# Patient Record
Sex: FEMALE | Race: WHITE | NOT HISPANIC OR LATINO | Employment: FULL TIME | URBAN - NONMETROPOLITAN AREA
[De-identification: names, ages, dates, MRNs, and addresses within clinical notes are randomized per-mention and may not be internally consistent; named-entity substitution may affect disease eponyms.]

---

## 2018-12-14 ENCOUNTER — HISTORICAL (OUTPATIENT)
Dept: ADMINISTRATIVE | Facility: HOSPITAL | Age: 38
End: 2018-12-14

## 2019-06-17 PROBLEM — F90.0 ATTENTION DEFICIT HYPERACTIVITY DISORDER (ADHD), PREDOMINANTLY INATTENTIVE TYPE: Status: ACTIVE | Noted: 2018-10-18

## 2019-10-16 PROBLEM — F90.0 ATTENTION DEFICIT HYPERACTIVITY DISORDER (ADHD), PREDOMINANTLY INATTENTIVE TYPE: Chronic | Status: ACTIVE | Noted: 2018-10-18

## 2022-06-27 ENCOUNTER — HISTORICAL (OUTPATIENT)
Dept: ADMINISTRATIVE | Facility: HOSPITAL | Age: 42
End: 2022-06-27

## 2023-03-15 ENCOUNTER — HOSPITAL ENCOUNTER (OUTPATIENT)
Dept: RADIOLOGY | Facility: HOSPITAL | Age: 43
Discharge: HOME | End: 2023-03-15
Attending: NURSE PRACTITIONER
Payer: MEDICAID

## 2023-03-15 DIAGNOSIS — K82.9 DISEASE OF GALLBLADDER: ICD-10-CM

## 2023-03-15 PROCEDURE — 78226 HEPATOBILIARY SYSTEM IMAGING: CPT | Mod: TC

## 2023-03-27 LAB — CRC RECOMMENDATION EXT: NORMAL

## 2023-06-26 ENCOUNTER — CLINICAL SUPPORT (OUTPATIENT)
Dept: RESPIRATORY THERAPY | Facility: HOSPITAL | Age: 43
End: 2023-06-26
Attending: NURSE PRACTITIONER
Payer: MEDICAID

## 2023-06-26 ENCOUNTER — HOSPITAL ENCOUNTER (OUTPATIENT)
Dept: RADIOLOGY | Facility: HOSPITAL | Age: 43
Discharge: HOME | End: 2023-06-26
Attending: NURSE PRACTITIONER
Payer: MEDICAID

## 2023-06-26 VITALS — WEIGHT: 242 LBS | BODY MASS INDEX: 44.53 KG/M2 | HEIGHT: 62 IN

## 2023-06-26 DIAGNOSIS — F90.9 ATTENTION DEFICIT HYPERACTIVITY DISORDER: ICD-10-CM

## 2023-06-26 DIAGNOSIS — F90.9 ATTENTION DEFICIT HYPERACTIVITY DISORDER: Primary | ICD-10-CM

## 2023-06-26 DIAGNOSIS — Z12.31 BREAST CANCER SCREENING BY MAMMOGRAM: ICD-10-CM

## 2023-06-26 PROCEDURE — 93005 ELECTROCARDIOGRAM TRACING: CPT

## 2023-06-26 PROCEDURE — 77067 SCR MAMMO BI INCL CAD: CPT | Mod: TC

## 2023-06-26 PROCEDURE — 93010 ELECTROCARDIOGRAM REPORT: CPT | Mod: ,,, | Performed by: INTERNAL MEDICINE

## 2023-06-26 PROCEDURE — 93010 EKG 12-LEAD: ICD-10-PCS | Mod: ,,, | Performed by: INTERNAL MEDICINE

## 2023-07-12 ENCOUNTER — HOSPITAL ENCOUNTER (OUTPATIENT)
Dept: RADIOLOGY | Facility: HOSPITAL | Age: 43
Discharge: HOME | End: 2023-07-12
Attending: NURSE PRACTITIONER
Payer: MEDICAID

## 2023-07-12 DIAGNOSIS — R92.8 ABNORMAL MAMMOGRAM: ICD-10-CM

## 2023-07-12 PROCEDURE — 76641 ULTRASOUND BREAST COMPLETE: CPT | Mod: TC,50

## 2023-12-18 ENCOUNTER — HOSPITAL ENCOUNTER (OUTPATIENT)
Dept: RADIOLOGY | Facility: HOSPITAL | Age: 43
Discharge: HOME | End: 2023-12-18
Attending: NURSE PRACTITIONER
Payer: MEDICAID

## 2023-12-18 DIAGNOSIS — M25.512 LEFT SHOULDER PAIN: ICD-10-CM

## 2023-12-18 PROCEDURE — 73030 X-RAY EXAM OF SHOULDER: CPT | Mod: TC,LT

## 2024-06-25 LAB
CHOLEST SERPL-MSCNC: 213 MG/DL (ref 0–200)
HBA1C MFR BLD: 5.9 % (ref 4–6)
HDLC SERPL-MCNC: 66 MG/DL (ref 35–70)
LDL/HDL RATIO: 1.9
NONHDLC SERPL-MCNC: 124 MG/DL
TRIGL SERPL-MCNC: 134 MG/DL (ref 40–160)

## 2024-07-03 ENCOUNTER — HOSPITAL ENCOUNTER (OUTPATIENT)
Dept: RADIOLOGY | Facility: HOSPITAL | Age: 44
Discharge: HOME | End: 2024-07-03
Attending: NURSE PRACTITIONER
Payer: MEDICAID

## 2024-07-03 DIAGNOSIS — Z12.31 SCREENING MAMMOGRAM FOR HIGH-RISK PATIENT: ICD-10-CM

## 2024-07-03 PROCEDURE — 77067 SCR MAMMO BI INCL CAD: CPT | Mod: TC

## 2024-07-23 ENCOUNTER — OFFICE VISIT (OUTPATIENT)
Dept: FAMILY MEDICINE | Facility: CLINIC | Age: 44
End: 2024-07-23
Payer: MEDICAID

## 2024-07-23 VITALS
TEMPERATURE: 98 F | HEART RATE: 76 BPM | DIASTOLIC BLOOD PRESSURE: 82 MMHG | OXYGEN SATURATION: 96 % | SYSTOLIC BLOOD PRESSURE: 120 MMHG | BODY MASS INDEX: 46.6 KG/M2 | HEIGHT: 63 IN | WEIGHT: 263 LBS

## 2024-07-23 DIAGNOSIS — Z00.00 ENCOUNTER FOR MEDICAL EXAMINATION TO ESTABLISH CARE: Primary | ICD-10-CM

## 2024-07-23 DIAGNOSIS — F90.0 ATTENTION DEFICIT HYPERACTIVITY DISORDER (ADHD), PREDOMINANTLY INATTENTIVE TYPE: Chronic | ICD-10-CM

## 2024-07-23 DIAGNOSIS — K57.90 DIVERTICULOSIS: ICD-10-CM

## 2024-07-23 DIAGNOSIS — R73.03 PREDIABETES: ICD-10-CM

## 2024-07-23 PROCEDURE — 3008F BODY MASS INDEX DOCD: CPT | Mod: CPTII,,, | Performed by: NURSE PRACTITIONER

## 2024-07-23 PROCEDURE — 1160F RVW MEDS BY RX/DR IN RCRD: CPT | Mod: CPTII,,, | Performed by: NURSE PRACTITIONER

## 2024-07-23 PROCEDURE — 3079F DIAST BP 80-89 MM HG: CPT | Mod: CPTII,,, | Performed by: NURSE PRACTITIONER

## 2024-07-23 PROCEDURE — 99203 OFFICE O/P NEW LOW 30 MIN: CPT | Mod: ,,, | Performed by: NURSE PRACTITIONER

## 2024-07-23 PROCEDURE — 1159F MED LIST DOCD IN RCRD: CPT | Mod: CPTII,,, | Performed by: NURSE PRACTITIONER

## 2024-07-23 PROCEDURE — 3074F SYST BP LT 130 MM HG: CPT | Mod: CPTII,,, | Performed by: NURSE PRACTITIONER

## 2024-07-23 RX ORDER — CITALOPRAM 10 MG/1
TABLET ORAL
COMMUNITY

## 2024-07-23 RX ORDER — ALBUTEROL SULFATE 90 UG/1
AEROSOL, METERED RESPIRATORY (INHALATION)
COMMUNITY
Start: 2024-02-12

## 2024-07-23 RX ORDER — TRIAMCINOLONE ACETONIDE 1 MG/G
CREAM TOPICAL
COMMUNITY
Start: 2024-07-17 | End: 2024-07-23

## 2024-07-23 RX ORDER — ATOMOXETINE 40 MG/1
40 CAPSULE ORAL DAILY
Qty: 30 CAPSULE | Refills: 0 | Status: SHIPPED | OUTPATIENT
Start: 2024-07-23 | End: 2024-08-22

## 2024-07-23 RX ORDER — LISDEXAMFETAMINE DIMESYLATE 50 MG/1
50 CAPSULE ORAL EVERY MORNING
COMMUNITY

## 2024-07-23 NOTE — PROGRESS NOTES
"Patient ID: [id number]     Chief Complaint: Establish Care      HPI:     Alice Hoffman is a 44 y.o. 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  She really likes the hours.  She has been dx w/prediabetes.  Tried several medications, most had se or didn't work.  Had repeat labs one month ago, uncertain of results but A1C had increased a little.   Colonoscopy last year for rectal bleeding, found diverticulosis. She's been able to figure out since then that taken everyday, both Celexa and Vyvanse cause her to have rectal bleeding even if taken alone. She's been off Celexa for months, denies any anxiety/depression significant enough to start another medication.    Past Medical History:  has no past medical history on file.    Social History:  reports that she has never smoked. She has never used smokeless tobacco.    Current Outpatient Medications   Medication Instructions    albuterol (PROVENTIL/VENTOLIN HFA) 90 mcg/actuation inhaler inhale 2 PUFFS EVERY 4 TO 6 HOURS AS NEEDED -- SHAKE WELL BEFORE USE    atomoxetine (STRATTERA) 40 mg, Oral, Daily    citalopram (CELEXA) 10 MG tablet TAKE 1 TABLET DAILY FOR DEPRESSION Oral for 30 Days    VYVANSE 50 mg, Oral, Every morning       Patient is allergic to penicillins.     Patient Care Team:  Kathie Lora, SIMINP-C as PCP - General (Family Medicine)  Javy Lora OD as Consulting Physician (Optometry)  Sicard, Mark A., MD (General Surgery)     Subjective     Review of Systems    See HPI     Objective     Visit Vitals  /82 (BP Location: Right arm, Patient Position: Sitting, BP Method: Medium (Manual))   Pulse 76   Temp 97.9 °F (36.6 °C) (Temporal)   Ht 5' 2.6" (1.59 m)   Wt 119.3 kg (263 lb)   SpO2 96%   BMI 47.19 kg/m²       Physical Exam  Vitals reviewed.   Constitutional:       General: She is not in acute distress.     Appearance: Normal appearance. She is obese.   HENT:      Head: " Normocephalic.      Nose: Nose normal.      Mouth/Throat:      Mouth: Mucous membranes are moist.      Pharynx: Oropharynx is clear.   Cardiovascular:      Rate and Rhythm: Normal rate and regular rhythm.      Heart sounds: Normal heart sounds.   Pulmonary:      Effort: Pulmonary effort is normal.      Breath sounds: Normal breath sounds.   Musculoskeletal:         General: Normal range of motion.      Cervical back: Normal range of motion and neck supple.   Skin:     General: Skin is warm and dry.   Neurological:      Mental Status: She is alert and oriented to person, place, and time. Mental status is at baseline.   Psychiatric:         Mood and Affect: Mood normal.         Behavior: Behavior normal.         Assessment & Plan:     1. Encounter for medical examination to establish care    2. Attention deficit hyperactivity disorder (ADHD), predominantly inattentive type  Overview:  Adderall caused stomach issues  On Vyvanse 50 mg. (Causes rectal bleeding when taken daily)    Assessment & Plan:  Stop Vyvanse.  Start Strattera 40 mg daily. RTC 1 month    Orders:  -     atomoxetine (STRATTERA) 40 MG capsule; Take 1 capsule (40 mg total) by mouth once daily.  Dispense: 30 capsule; Refill: 0    3. Prediabetes  Assessment & Plan:  Get most recent labs for review      4. Diverticulosis  Assessment & Plan:  Get colonoscopy report           Follow up for 1), 1 mo f/u ha, review labs. In addition to their next scheduled appointment, the patient has also been instructed to follow up on as needed basis.     Future Appointments   Date Time Provider Department Center   8/29/2024  7:30 AM Julisa Harrington, SAR[facility] I spent a total of 38 minutes on the day of the visit.  This includes face to face time and non-face to face time preparing to see the patient (eg, review of tests), obtaining and/or reviewing separately obtained history, documenting clinical information in the electronic or other health record,  independently interpreting results and communicating results to the patient/family/caregiver, or care coordinator.

## 2024-07-24 ENCOUNTER — PATIENT OUTREACH (OUTPATIENT)
Facility: CLINIC | Age: 44
End: 2024-07-24
Payer: MEDICAID

## 2024-09-04 ENCOUNTER — OFFICE VISIT (OUTPATIENT)
Dept: FAMILY MEDICINE | Facility: CLINIC | Age: 44
End: 2024-09-04
Payer: MEDICAID

## 2024-09-04 ENCOUNTER — PATIENT OUTREACH (OUTPATIENT)
Facility: CLINIC | Age: 44
End: 2024-09-04
Payer: MEDICAID

## 2024-09-04 VITALS
HEART RATE: 86 BPM | SYSTOLIC BLOOD PRESSURE: 118 MMHG | OXYGEN SATURATION: 99 % | DIASTOLIC BLOOD PRESSURE: 72 MMHG | BODY MASS INDEX: 46.78 KG/M2 | HEIGHT: 63 IN | WEIGHT: 264 LBS | TEMPERATURE: 98 F

## 2024-09-04 DIAGNOSIS — E66.01 CLASS 3 SEVERE OBESITY WITH BODY MASS INDEX (BMI) OF 45.0 TO 49.9 IN ADULT, UNSPECIFIED OBESITY TYPE, UNSPECIFIED WHETHER SERIOUS COMORBIDITY PRESENT: ICD-10-CM

## 2024-09-04 DIAGNOSIS — R12 HEARTBURN: ICD-10-CM

## 2024-09-04 DIAGNOSIS — R73.03 PREDIABETES: Primary | ICD-10-CM

## 2024-09-04 PROCEDURE — 3008F BODY MASS INDEX DOCD: CPT | Mod: CPTII,,, | Performed by: NURSE PRACTITIONER

## 2024-09-04 PROCEDURE — 1160F RVW MEDS BY RX/DR IN RCRD: CPT | Mod: CPTII,,, | Performed by: NURSE PRACTITIONER

## 2024-09-04 PROCEDURE — 3074F SYST BP LT 130 MM HG: CPT | Mod: CPTII,,, | Performed by: NURSE PRACTITIONER

## 2024-09-04 PROCEDURE — 99214 OFFICE O/P EST MOD 30 MIN: CPT | Mod: ,,, | Performed by: NURSE PRACTITIONER

## 2024-09-04 PROCEDURE — 1159F MED LIST DOCD IN RCRD: CPT | Mod: CPTII,,, | Performed by: NURSE PRACTITIONER

## 2024-09-04 PROCEDURE — 3044F HG A1C LEVEL LT 7.0%: CPT | Mod: CPTII,,, | Performed by: NURSE PRACTITIONER

## 2024-09-04 PROCEDURE — 3078F DIAST BP <80 MM HG: CPT | Mod: CPTII,,, | Performed by: NURSE PRACTITIONER

## 2024-09-04 RX ORDER — SEMAGLUTIDE 1 MG/.5ML
1 INJECTION, SOLUTION SUBCUTANEOUS
Status: CANCELLED | OUTPATIENT
Start: 2024-10-16

## 2024-09-04 RX ORDER — SEMAGLUTIDE 0.5 MG/.5ML
0.5 INJECTION, SOLUTION SUBCUTANEOUS
Qty: 2 ML | Refills: 0 | Status: SHIPPED | OUTPATIENT
Start: 2024-09-25 | End: 2024-09-04 | Stop reason: SDUPTHER

## 2024-09-04 RX ORDER — SEMAGLUTIDE 0.25 MG/.5ML
0.25 INJECTION, SOLUTION SUBCUTANEOUS
Qty: 2 ML | Refills: 0 | Status: SHIPPED | OUTPATIENT
Start: 2024-09-04 | End: 2024-10-02

## 2024-09-04 RX ORDER — SEMAGLUTIDE 0.25 MG/.5ML
0.25 INJECTION, SOLUTION SUBCUTANEOUS
Qty: 2 ML | Refills: 0 | Status: SHIPPED | OUTPATIENT
Start: 2024-09-04 | End: 2024-09-04 | Stop reason: SDUPTHER

## 2024-09-04 RX ORDER — SEMAGLUTIDE 0.5 MG/.5ML
0.5 INJECTION, SOLUTION SUBCUTANEOUS
Qty: 2 ML | Refills: 0 | Status: SHIPPED | OUTPATIENT
Start: 2024-09-25 | End: 2024-10-23

## 2024-09-04 NOTE — PROGRESS NOTES
Health Maintenance Topic(s) Outreach Outcomes & Actions Taken:    Lab(s) - Outreach Outcomes & Actions Taken  : Hyperlinked labs from [facility]

## 2024-09-04 NOTE — PROGRESS NOTES
"Patient ID: [id number]     Chief Complaint: Results      HPI:     Alice Hoffman is a 44 y.o. female in the office for Results      Has samples of rybelsis for several months last year, does was supposed to be increased but never received.  Caused her to feel dizzy, took it at night.     Past Medical History:  has no past medical history on file.    Social History:  reports that she has never smoked. She has never used smokeless tobacco.    Current Outpatient Medications   Medication Instructions    albuterol (PROVENTIL/VENTOLIN HFA) 90 mcg/actuation inhaler inhale 2 PUFFS EVERY 4 TO 6 HOURS AS NEEDED -- SHAKE WELL BEFORE USE       Patient is allergic to penicillins.     Patient Care Team:  Julisa Harrington FNP-C as PCP - General (Family Medicine)  Javy Lora OD as Consulting Physician (Optometry)  Sicard, Mark A., MD (General Surgery)     Subjective     Review of Systems    See HPI     Objective     Visit Vitals  /72 (BP Location: Right arm)   Pulse 86   Temp 98.1 °F (36.7 °C) (Temporal)   Ht 5' 2.6" (1.59 m)   Wt 119.7 kg (264 lb)   SpO2 99%   BMI 47.37 kg/m²       Physical Exam    Assessment & Plan:     1. Prediabetes    2. Heartburn         No follow-ups on file. In addition to their next scheduled appointment, the patient has also been instructed to follow up on as needed basis.     No future appointments.       "

## 2024-09-26 DIAGNOSIS — E66.01 CLASS 3 SEVERE OBESITY WITH BODY MASS INDEX (BMI) OF 45.0 TO 49.9 IN ADULT, UNSPECIFIED OBESITY TYPE, UNSPECIFIED WHETHER SERIOUS COMORBIDITY PRESENT: ICD-10-CM

## 2024-09-26 RX ORDER — SEMAGLUTIDE 0.25 MG/.5ML
0.25 INJECTION, SOLUTION SUBCUTANEOUS
Qty: 2 ML | Refills: 0 | OUTPATIENT
Start: 2024-09-26 | End: 2024-10-24

## 2024-09-26 RX ORDER — SEMAGLUTIDE 0.5 MG/.5ML
0.5 INJECTION, SOLUTION SUBCUTANEOUS
Qty: 2 ML | Refills: 0 | OUTPATIENT
Start: 2024-09-26 | End: 2024-10-24

## 2024-10-23 DIAGNOSIS — E66.813 CLASS 3 SEVERE OBESITY WITH BODY MASS INDEX (BMI) OF 45.0 TO 49.9 IN ADULT, UNSPECIFIED OBESITY TYPE, UNSPECIFIED WHETHER SERIOUS COMORBIDITY PRESENT: ICD-10-CM

## 2024-10-23 DIAGNOSIS — E66.01 CLASS 3 SEVERE OBESITY WITH BODY MASS INDEX (BMI) OF 45.0 TO 49.9 IN ADULT, UNSPECIFIED OBESITY TYPE, UNSPECIFIED WHETHER SERIOUS COMORBIDITY PRESENT: ICD-10-CM

## 2024-10-23 RX ORDER — SEMAGLUTIDE 0.5 MG/.5ML
0.5 INJECTION, SOLUTION SUBCUTANEOUS
Qty: 2 ML | Refills: 0 | Status: CANCELLED | OUTPATIENT
Start: 2024-10-23 | End: 2024-11-20

## 2024-10-24 RX ORDER — SEMAGLUTIDE 1 MG/.5ML
1 INJECTION, SOLUTION SUBCUTANEOUS
Qty: 1 ML | Refills: 5 | Status: SHIPPED | OUTPATIENT
Start: 2024-10-24

## 2024-10-25 ENCOUNTER — PATIENT MESSAGE (OUTPATIENT)
Dept: FAMILY MEDICINE | Facility: CLINIC | Age: 44
End: 2024-10-25
Payer: MEDICAID

## 2024-10-25 ENCOUNTER — TELEPHONE (OUTPATIENT)
Dept: FAMILY MEDICINE | Facility: CLINIC | Age: 44
End: 2024-10-25
Payer: MEDICAID

## 2024-10-25 DIAGNOSIS — E66.813 CLASS 3 SEVERE OBESITY WITH BODY MASS INDEX (BMI) OF 45.0 TO 49.9 IN ADULT, UNSPECIFIED OBESITY TYPE, UNSPECIFIED WHETHER SERIOUS COMORBIDITY PRESENT: ICD-10-CM

## 2024-10-25 DIAGNOSIS — E66.01 CLASS 3 SEVERE OBESITY WITH BODY MASS INDEX (BMI) OF 45.0 TO 49.9 IN ADULT, UNSPECIFIED OBESITY TYPE, UNSPECIFIED WHETHER SERIOUS COMORBIDITY PRESENT: ICD-10-CM

## 2024-10-25 RX ORDER — SEMAGLUTIDE 0.5 MG/.5ML
0.5 INJECTION, SOLUTION SUBCUTANEOUS
Qty: 2 ML | Refills: 0 | Status: SHIPPED | OUTPATIENT
Start: 2024-10-25 | End: 2024-11-22

## 2024-11-11 ENCOUNTER — TELEPHONE (OUTPATIENT)
Dept: FAMILY MEDICINE | Facility: CLINIC | Age: 44
End: 2024-11-11

## 2024-11-11 ENCOUNTER — OFFICE VISIT (OUTPATIENT)
Dept: FAMILY MEDICINE | Facility: CLINIC | Age: 44
End: 2024-11-11
Payer: MEDICAID

## 2024-11-11 VITALS
HEART RATE: 81 BPM | HEIGHT: 63 IN | OXYGEN SATURATION: 97 % | BODY MASS INDEX: 46.95 KG/M2 | SYSTOLIC BLOOD PRESSURE: 124 MMHG | WEIGHT: 265 LBS | TEMPERATURE: 97 F | DIASTOLIC BLOOD PRESSURE: 88 MMHG

## 2024-11-11 DIAGNOSIS — E11.9 TYPE 2 DIABETES MELLITUS WITHOUT COMPLICATION, WITHOUT LONG-TERM CURRENT USE OF INSULIN: Primary | ICD-10-CM

## 2024-11-11 PROCEDURE — 3074F SYST BP LT 130 MM HG: CPT | Mod: CPTII,,, | Performed by: NURSE PRACTITIONER

## 2024-11-11 PROCEDURE — 99214 OFFICE O/P EST MOD 30 MIN: CPT | Mod: ,,, | Performed by: NURSE PRACTITIONER

## 2024-11-11 PROCEDURE — 1160F RVW MEDS BY RX/DR IN RCRD: CPT | Mod: CPTII,,, | Performed by: NURSE PRACTITIONER

## 2024-11-11 PROCEDURE — 1159F MED LIST DOCD IN RCRD: CPT | Mod: CPTII,,, | Performed by: NURSE PRACTITIONER

## 2024-11-11 PROCEDURE — 3008F BODY MASS INDEX DOCD: CPT | Mod: CPTII,,, | Performed by: NURSE PRACTITIONER

## 2024-11-11 PROCEDURE — 3079F DIAST BP 80-89 MM HG: CPT | Mod: CPTII,,, | Performed by: NURSE PRACTITIONER

## 2024-11-11 PROCEDURE — 3044F HG A1C LEVEL LT 7.0%: CPT | Mod: CPTII,,, | Performed by: NURSE PRACTITIONER

## 2024-11-11 RX ORDER — SEMAGLUTIDE 0.68 MG/ML
0.5 INJECTION, SOLUTION SUBCUTANEOUS
Qty: 3 ML | Refills: 2 | Status: SHIPPED | OUTPATIENT
Start: 2024-11-11 | End: 2025-02-09

## 2024-11-11 NOTE — PROGRESS NOTES
"Patient ID: [id number]     Chief Complaint: Obesity (2 month follow up )      HPI:     Alice Hoffman is a 44 y.o. female in the office for Obesity (2 month follow up )  Was doing well with weight loss while taking semaglutide.  Has been off for about 1 month, some weight re-gained.  Has been checking blood sugar with a family members glucometer and fasting sugars over 130.  She has a history of prediabetes, suspect A1c has increased.    Past Medical History:  has no past medical history on file.    Social History:  reports that she has never smoked. She has never used smokeless tobacco.    Current Outpatient Medications   Medication Instructions    albuterol (PROVENTIL/VENTOLIN HFA) 90 mcg/actuation inhaler inhale 2 PUFFS EVERY 4 TO 6 HOURS AS NEEDED -- SHAKE WELL BEFORE USE    OZEMPIC 0.5 mg, Subcutaneous, Every 7 days       Patient is allergic to penicillins.     Patient Care Team:  Julisa Harrington FNP-C as PCP - General (Family Medicine)  Javy Lora OD as Consulting Physician (Optometry)  Sicard, Mark A., MD (General Surgery)     Subjective     Review of Systems    See HPI     Objective     Visit Vitals  /88 (BP Location: Left arm, Patient Position: Sitting)   Pulse 81   Temp 97.3 °F (36.3 °C) (Temporal)   Ht 5' 2.6" (1.59 m)   Wt 120.2 kg (265 lb)   SpO2 97%   BMI 47.55 kg/m²       Physical Exam  Vitals reviewed.   Constitutional:       General: She is not in acute distress.     Appearance: She is obese.   Cardiovascular:      Rate and Rhythm: Normal rate and regular rhythm.      Heart sounds: Normal heart sounds.   Pulmonary:      Effort: Pulmonary effort is normal.      Breath sounds: Normal breath sounds.   Skin:     General: Skin is warm and dry.   Neurological:      Mental Status: She is alert and oriented to person, place, and time.   Psychiatric:         Mood and Affect: Mood normal.         Assessment & Plan:     1. Type 2 diabetes mellitus without complication, without long-term " current use of insulin  Assessment & Plan:  Encouraged diet and exercise  Start semaglutide 0.25 mg and increase to 0.5 mg after 1 month.    RTC 3 months     Orders:  -     semaglutide (OZEMPIC) 0.25 mg or 0.5 mg (2 mg/3 mL) pen injector; Inject 0.5 mg into the skin every 7 days.  Dispense: 3 mL; Refill: 2  -     CBC Auto Differential; Future; Expected date: 02/11/2025  -     Comprehensive Metabolic Panel; Future; Expected date: 02/11/2025  -     Lipid Panel; Future; Expected date: 02/11/2025  -     TSH; Future; Expected date: 02/11/2025  -     Hemoglobin A1C; Future; Expected date: 02/11/2025  -     Iron and TIBC; Future; Expected date: 02/11/2025  -     Ferritin; Future; Expected date: 02/11/2025       Follow up in about 3 months (around 2/11/2025) for Obesity, fasting labs prior. In addition to their next scheduled appointment, the patient has also been instructed to follow up on as needed basis.     Future Appointments   Date Time Provider Department Center   2/10/2025  9:40 AM LAB, [facility] LABORATORY D[facility]   2/17/2025 11:30 AM Julisa Harrington, FNP-C [facility]

## 2024-11-14 PROBLEM — E11.9 TYPE 2 DIABETES MELLITUS WITHOUT COMPLICATION, WITHOUT LONG-TERM CURRENT USE OF INSULIN: Status: ACTIVE | Noted: 2024-11-14

## 2024-11-14 NOTE — ASSESSMENT & PLAN NOTE
Encouraged diet and exercise  Start semaglutide 0.25 mg and increase to 0.5 mg after 1 month.    RTC 3 months

## 2025-01-06 ENCOUNTER — PATIENT MESSAGE (OUTPATIENT)
Dept: FAMILY MEDICINE | Facility: CLINIC | Age: 45
End: 2025-01-06
Payer: MEDICAID

## 2025-01-06 DIAGNOSIS — E11.9 TYPE 2 DIABETES MELLITUS WITHOUT COMPLICATION, WITHOUT LONG-TERM CURRENT USE OF INSULIN: ICD-10-CM

## 2025-01-06 RX ORDER — SEMAGLUTIDE 0.68 MG/ML
0.5 INJECTION, SOLUTION SUBCUTANEOUS
Qty: 3 ML | Refills: 2 | Status: SHIPPED | OUTPATIENT
Start: 2025-01-06 | End: 2025-04-06

## 2025-02-03 ENCOUNTER — OFFICE VISIT (OUTPATIENT)
Dept: FAMILY MEDICINE | Facility: CLINIC | Age: 45
End: 2025-02-03
Payer: MEDICAID

## 2025-02-03 VITALS
HEART RATE: 78 BPM | HEIGHT: 63 IN | WEIGHT: 256.19 LBS | DIASTOLIC BLOOD PRESSURE: 84 MMHG | OXYGEN SATURATION: 99 % | BODY MASS INDEX: 45.39 KG/M2 | SYSTOLIC BLOOD PRESSURE: 128 MMHG | TEMPERATURE: 97 F

## 2025-02-03 DIAGNOSIS — M54.50 ACUTE BILATERAL LOW BACK PAIN, UNSPECIFIED WHETHER SCIATICA PRESENT: Primary | ICD-10-CM

## 2025-02-03 PROCEDURE — 3074F SYST BP LT 130 MM HG: CPT | Mod: CPTII,,, | Performed by: NURSE PRACTITIONER

## 2025-02-03 PROCEDURE — 3079F DIAST BP 80-89 MM HG: CPT | Mod: CPTII,,, | Performed by: NURSE PRACTITIONER

## 2025-02-03 PROCEDURE — 3008F BODY MASS INDEX DOCD: CPT | Mod: CPTII,,, | Performed by: NURSE PRACTITIONER

## 2025-02-03 PROCEDURE — 1159F MED LIST DOCD IN RCRD: CPT | Mod: CPTII,,, | Performed by: NURSE PRACTITIONER

## 2025-02-03 PROCEDURE — 99214 OFFICE O/P EST MOD 30 MIN: CPT | Mod: ,,, | Performed by: NURSE PRACTITIONER

## 2025-02-03 RX ORDER — PREDNISONE 20 MG/1
20 TABLET ORAL 2 TIMES DAILY
Qty: 14 TABLET | Refills: 0 | Status: SHIPPED | OUTPATIENT
Start: 2025-02-03 | End: 2025-02-10

## 2025-02-03 RX ORDER — TIZANIDINE 4 MG/1
4 TABLET ORAL EVERY 8 HOURS PRN
Qty: 20 TABLET | Refills: 0 | Status: SHIPPED | OUTPATIENT
Start: 2025-02-03

## 2025-02-03 NOTE — PROGRESS NOTES
"Patient ID: Alice Hoffman  : 1980     Chief Complaint: Back Pain    Allergies: Patient is allergic to penicillins.     History of Present Illness:  The patient is a 44 y.o. White female who presents to clinic for evaluation and management with a chief complaint of Back Pain   HPI Pt states that she was throwing trash into a dumpster and felt something pop in her back on Friday.  She has used NSIADS over the weekend without improvement.  She saw chiropractor today which helped initially, the pain cam back.  No radiation of the pain down her leg, but it is starting to moved into her left buttock a little she reports.    Social History:  reports that she has never smoked. She has never used smokeless tobacco.    Past Medical History:  has no past medical history on file.    Care Team: Patient Care Team:  Julisa Harrington FNP-C as PCP - General (Family Medicine)  Javy Lora OD as Consulting Physician (Optometry)  Sicard, Mark A., MD (General Surgery)     Current Medications:  Current Outpatient Medications   Medication Instructions    albuterol (PROVENTIL/VENTOLIN HFA) 90 mcg/actuation inhaler inhale 2 PUFFS EVERY 4 TO 6 HOURS AS NEEDED -- SHAKE WELL BEFORE USE    OZEMPIC 0.5 mg, Subcutaneous, Every 7 days    predniSONE (DELTASONE) 20 mg, Oral, 2 times daily    tiZANidine (ZANAFLEX) 4 mg, Oral, Every 8 hours PRN       Review of Systems   Twelve point review of system conducted, negative except as stated in the history of present illness.  See HPI for details.      Visit Vitals  /84 (BP Location: Right arm, Patient Position: Sitting)   Pulse 78   Temp 97.3 °F (36.3 °C) (Oral)   Ht 5' 2.6" (1.59 m)   Wt 116.2 kg (256 lb 3.2 oz)   SpO2 99%   BMI 45.97 kg/m²       Physical Exam  Constitutional:       Appearance: Normal appearance.   HENT:      Head: Normocephalic and atraumatic.      Nose: Nose normal.      Mouth/Throat:      Mouth: Mucous membranes are moist.      Pharynx: Oropharynx is " clear.   Eyes:      Conjunctiva/sclera: Conjunctivae normal.   Cardiovascular:      Rate and Rhythm: Normal rate and regular rhythm.   Pulmonary:      Effort: Pulmonary effort is normal.      Breath sounds: Normal breath sounds.   Abdominal:      General: Bowel sounds are normal.      Palpations: Abdomen is soft.   Musculoskeletal:      Cervical back: Normal range of motion and neck supple.      Lumbar back: Spasms and tenderness present. Decreased range of motion.   Skin:     General: Skin is warm.      Capillary Refill: Capillary refill takes less than 2 seconds.   Neurological:      Mental Status: She is alert.          Labs Reviewed:  Chemistry:    Lab Results   Component Value Date    HGBA1C 5.9 06/25/2024        Hematology:    Lipid Panel:  Lab Results   Component Value Date    CHOL 213 (A) 06/25/2024    HDL 66 06/25/2024    TRIG 134 06/25/2024        Assessment & Plan:    1. Acute bilateral low back pain, unspecified whether sciatica present  Comments:  rest, ice gentle stretching, chiropractor  Orders:  -     predniSONE (DELTASONE) 20 MG tablet; Take 1 tablet (20 mg total) by mouth 2 (two) times daily. for 7 days  Dispense: 14 tablet; Refill: 0  -     tiZANidine (ZANAFLEX) 4 MG tablet; Take 1 tablet (4 mg total) by mouth every 8 (eight) hours as needed (muscle spasms).  Dispense: 20 tablet; Refill: 0         Follow up in about 2 weeks (around 2/17/2025). Call sooner if needed.          Lab Frequency Next Occurrence   CBC Auto Differential Once 02/11/2025   Comprehensive Metabolic Panel Once 02/11/2025   Lipid Panel Once 02/11/2025   TSH Once 02/11/2025   Hemoglobin A1C Once 02/11/2025   Iron and TIBC Once 02/11/2025   Ferritin Once 02/11/2025

## 2025-02-10 PROCEDURE — 85025 COMPLETE CBC W/AUTO DIFF WBC: CPT | Performed by: NURSE PRACTITIONER

## 2025-02-10 PROCEDURE — 80061 LIPID PANEL: CPT | Performed by: NURSE PRACTITIONER

## 2025-02-10 PROCEDURE — 83036 HEMOGLOBIN GLYCOSYLATED A1C: CPT | Performed by: NURSE PRACTITIONER

## 2025-02-10 PROCEDURE — 84443 ASSAY THYROID STIM HORMONE: CPT | Performed by: NURSE PRACTITIONER

## 2025-02-10 PROCEDURE — 80053 COMPREHEN METABOLIC PANEL: CPT | Performed by: NURSE PRACTITIONER

## 2025-02-10 PROCEDURE — 83550 IRON BINDING TEST: CPT | Performed by: NURSE PRACTITIONER

## 2025-02-10 PROCEDURE — 82728 ASSAY OF FERRITIN: CPT | Performed by: NURSE PRACTITIONER

## 2025-02-17 ENCOUNTER — OFFICE VISIT (OUTPATIENT)
Dept: FAMILY MEDICINE | Facility: CLINIC | Age: 45
End: 2025-02-17
Payer: MEDICAID

## 2025-02-17 ENCOUNTER — TELEPHONE (OUTPATIENT)
Dept: FAMILY MEDICINE | Facility: CLINIC | Age: 45
End: 2025-02-17

## 2025-02-17 VITALS
TEMPERATURE: 98 F | DIASTOLIC BLOOD PRESSURE: 80 MMHG | SYSTOLIC BLOOD PRESSURE: 122 MMHG | OXYGEN SATURATION: 96 % | HEART RATE: 105 BPM | HEIGHT: 63 IN | BODY MASS INDEX: 46.25 KG/M2 | WEIGHT: 261 LBS

## 2025-02-17 DIAGNOSIS — J06.9 UPPER RESPIRATORY TRACT INFECTION, UNSPECIFIED TYPE: ICD-10-CM

## 2025-02-17 DIAGNOSIS — E61.1 IRON DEFICIENCY: ICD-10-CM

## 2025-02-17 DIAGNOSIS — E11.9 TYPE 2 DIABETES MELLITUS WITHOUT COMPLICATION, WITHOUT LONG-TERM CURRENT USE OF INSULIN: Primary | ICD-10-CM

## 2025-02-17 DIAGNOSIS — E11.9 TYPE 2 DIABETES MELLITUS WITHOUT COMPLICATION, WITHOUT LONG-TERM CURRENT USE OF INSULIN: ICD-10-CM

## 2025-02-17 PROBLEM — R12 HEARTBURN: Status: RESOLVED | Noted: 2024-09-04 | Resolved: 2025-02-17

## 2025-02-17 RX ORDER — SEMAGLUTIDE 0.68 MG/ML
0.5 INJECTION, SOLUTION SUBCUTANEOUS
Qty: 3 ML | Refills: 2 | Status: SHIPPED | OUTPATIENT
Start: 2025-02-17 | End: 2025-05-18

## 2025-02-17 NOTE — PROGRESS NOTES
"Patient ID: [id number]     Chief Complaint: Diabetes and Sore Throat      HPI:     Alice Hoffman is a 45 y.o. female here today for Diabetes and Sore Throat  Diabetes well-controlled.  She reports feeling much better she did prior to the start of Ozempic.  Does not feel her blood sugar fluctuating as much as it was before.  She denies any side effects.  Her heartburn has resolved.  She started feeling ill 3 days ago.  She denies any fever or chills.  No body aches.  She has a cough that is worse at night.  Has been wheezing a little at night.  She uses her albuterol inhaler.  She denies any shortness of breath or chest pain.    Past Medical History:  has a past medical history of Heartburn.    Surgical History:  has a past surgical history that includes Hysterectomy (10/07/2019); Gallbladder surgery (06/2023); and Colonoscopy.    Family History: family history includes Breast cancer in her paternal aunt and paternal grandmother.    Social History:  reports that she has never smoked. She has never used smokeless tobacco.    Current Outpatient Medications   Medication Instructions    albuterol (PROVENTIL/VENTOLIN HFA) 90 mcg/actuation inhaler inhale 2 PUFFS EVERY 4 TO 6 HOURS AS NEEDED -- SHAKE WELL BEFORE USE    OZEMPIC 0.5 mg, Subcutaneous, Every 7 days    tiZANidine (ZANAFLEX) 4 mg, Oral, Every 8 hours PRN       Patient is allergic to penicillins.     Patient Care Team:  Julisa Harrington FNP-C as PCP - General (Family Medicine)  Javy Lora OD as Consulting Physician (Optometry)  Sicard, Mark A., MD (General Surgery)       Subjective:     Review of Systems    See HPI     Objective:     Visit Vitals  /80 (BP Location: Left arm)   Pulse 105   Temp 98.1 °F (36.7 °C) (Temporal)   Ht 5' 2.6" (1.59 m)   Wt 118.4 kg (261 lb)   SpO2 96%   BMI 46.83 kg/m²       Physical Exam  Vitals reviewed.   Constitutional:       General: She is not in acute distress.  HENT:      Head: Normocephalic.      Right Ear: " Tympanic membrane and external ear normal.      Left Ear: Tympanic membrane and external ear normal.      Nose: Congestion present.      Mouth/Throat:      Mouth: Mucous membranes are moist.      Pharynx: Oropharyngeal exudate and posterior oropharyngeal erythema present.   Cardiovascular:      Rate and Rhythm: Normal rate and regular rhythm.   Pulmonary:      Effort: Pulmonary effort is normal.      Breath sounds: Normal breath sounds.   Musculoskeletal:         General: Normal range of motion.      Cervical back: Normal range of motion and neck supple.   Lymphadenopathy:      Cervical: Cervical adenopathy (left) present.   Skin:     General: Skin is warm and dry.   Neurological:      Mental Status: She is alert and oriented to person, place, and time. Mental status is at baseline.   Psychiatric:         Mood and Affect: Mood normal.         Behavior: Behavior normal.         Labs Reviewed:     Chemistry:  Lab Results   Component Value Date     02/10/2025    K 4.7 02/10/2025    BUN 14 02/10/2025    CREATININE 0.84 02/10/2025    EGFRNORACEVR 88 02/10/2025    GLUCOSE 104 02/10/2025    CALCIUM 9.2 02/10/2025    ALKPHOS 131 02/10/2025    LABPROT 6.6 02/10/2025    ALBUMIN 3.8 02/10/2025    AST 22 02/10/2025    ALT 21 02/10/2025    TSH 2.250 02/10/2025        Lab Results   Component Value Date    HGBA1C 5.6 02/10/2025        Hematology:  Lab Results   Component Value Date    WBC 9.75 02/10/2025    RBC 4.68 02/10/2025    HGB 12.9 02/10/2025    HCT 38.7 02/10/2025    MCV 82.7 02/10/2025    MCH 27.6 02/10/2025    MCHC 33.3 02/10/2025    RDW 13.2 02/10/2025     02/10/2025    MPV 11.1 02/10/2025       Lipid Panel:  Lab Results   Component Value Date    CHOL 202 (H) 02/10/2025    HDL 71 (H) 02/10/2025    LDLDIRECT 98.3 02/10/2025    TRIG 179 02/10/2025         Assessment & Plan:     1. Type 2 diabetes mellitus without complication, without long-term current use of insulin  Assessment & Plan:  Increase Ozempic to 1  mg weekly.   Lab Results   Component Value Date    HGBA1C 5.6 02/10/2025        Encouraged to follow ADA diet. Avoid soda, sweets, and limit rice/pasta/breads/starches (no more than 45-50 grams per meal).   Encouraged to implement exercise into daily regimen.  Encouraged compliance with both medications and diet to limit long term and negative effects from the disease.    Monitory glucose levels each morning and record.  Please bring for review at next appointment.  Stressed importance of daily foot exams and annual dilated eye exams.        2. Iron deficiency  Overview:  Low iron, no anemia.  Suggest trial of oral iron.       3. Upper respiratory tract infection, unspecified type  Assessment & Plan:  Get plenty of rest, increase fluid intake, avoid alcohol and smoking.    Use OTC cold meds for symptoms.  If you have high blood pressure, avoid products with pseudoephedrine.    Fever/Headache/Body Aches: Use OTC Tylenol or Ibuprofen    Sore Throat: Use OTC lozenges or throat sprays, gargle with warm salt and water, warm tea with honey.    Nasal Congestion: Use OTC Mucinex D, humidifier or steamy shower.    Cough: Use dextromethorphan/doxylamine Cough Syrup at night.    Expect resolution over the next 7-10 days    If symptoms fail to resolve after 7-10 days or they worsen, this infection may have turned into a bacterial sinusitis and you may need some additional medications. Notify the office and they will be called out for you.          Follow up for 1), 3 mo, Obesity. In addition to their scheduled follow up, the patient has also been instructed to follow up on as needed basis.     Future Appointments   Date Time Provider Department Center   5/19/2025 11:30 AM Julisa Harrington, SIMINP-C [facility]

## 2025-02-17 NOTE — ASSESSMENT & PLAN NOTE
Get plenty of rest, increase fluid intake, avoid alcohol and smoking.    Use OTC cold meds for symptoms.  If you have high blood pressure, avoid products with pseudoephedrine.    Fever/Headache/Body Aches: Use OTC Tylenol or Ibuprofen    Sore Throat: Use OTC lozenges or throat sprays, gargle with warm salt and water, warm tea with honey.    Nasal Congestion: Use OTC Mucinex D, humidifier or steamy shower.    Cough: Use dextromethorphan/doxylamine Cough Syrup at night.    Expect resolution over the next 7-10 days    If symptoms fail to resolve after 7-10 days or they worsen, this infection may have turned into a bacterial sinusitis and you may need some additional medications. Notify the office and they will be called out for you.

## 2025-02-17 NOTE — ASSESSMENT & PLAN NOTE
Increase Ozempic to 1 mg weekly.   Lab Results   Component Value Date    HGBA1C 5.6 02/10/2025        Encouraged to follow ADA diet. Avoid soda, sweets, and limit rice/pasta/breads/starches (no more than 45-50 grams per meal).   Encouraged to implement exercise into daily regimen.  Encouraged compliance with both medications and diet to limit long term and negative effects from the disease.    Monitory glucose levels each morning and record.  Please bring for review at next appointment.  Stressed importance of daily foot exams and annual dilated eye exams.

## 2025-03-12 ENCOUNTER — PATIENT MESSAGE (OUTPATIENT)
Dept: FAMILY MEDICINE | Facility: CLINIC | Age: 45
End: 2025-03-12
Payer: MEDICAID

## 2025-03-12 DIAGNOSIS — E11.9 TYPE 2 DIABETES MELLITUS WITHOUT COMPLICATION, WITHOUT LONG-TERM CURRENT USE OF INSULIN: Primary | ICD-10-CM

## 2025-03-12 DIAGNOSIS — E11.9 TYPE 2 DIABETES MELLITUS WITHOUT COMPLICATION, WITHOUT LONG-TERM CURRENT USE OF INSULIN: ICD-10-CM

## 2025-03-12 RX ORDER — SEMAGLUTIDE 1.34 MG/ML
1 INJECTION, SOLUTION SUBCUTANEOUS
Qty: 3 ML | Refills: 11 | Status: SHIPPED | OUTPATIENT
Start: 2025-03-12 | End: 2026-03-12

## 2025-03-12 RX ORDER — SEMAGLUTIDE 1.34 MG/ML
1 INJECTION, SOLUTION SUBCUTANEOUS
Qty: 3 ML | Refills: 11 | Status: SHIPPED | OUTPATIENT
Start: 2025-03-12 | End: 2025-03-12 | Stop reason: SDUPTHER

## 2025-04-01 ENCOUNTER — TELEPHONE (OUTPATIENT)
Dept: FAMILY MEDICINE | Facility: CLINIC | Age: 45
End: 2025-04-01
Payer: MEDICAID

## 2025-05-29 ENCOUNTER — OFFICE VISIT (OUTPATIENT)
Dept: FAMILY MEDICINE | Facility: CLINIC | Age: 45
End: 2025-05-29
Payer: MEDICAID

## 2025-05-29 VITALS
SYSTOLIC BLOOD PRESSURE: 116 MMHG | DIASTOLIC BLOOD PRESSURE: 80 MMHG | BODY MASS INDEX: 44.12 KG/M2 | HEIGHT: 63 IN | HEART RATE: 80 BPM | OXYGEN SATURATION: 98 % | TEMPERATURE: 97 F | WEIGHT: 249 LBS

## 2025-05-29 DIAGNOSIS — E61.1 IRON DEFICIENCY: ICD-10-CM

## 2025-05-29 DIAGNOSIS — E11.9 TYPE 2 DIABETES MELLITUS WITHOUT COMPLICATION, WITHOUT LONG-TERM CURRENT USE OF INSULIN: Primary | ICD-10-CM

## 2025-05-29 DIAGNOSIS — M79.672 LEFT FOOT PAIN: ICD-10-CM

## 2025-05-29 DIAGNOSIS — Z12.39 ENCOUNTER FOR SCREENING FOR MALIGNANT NEOPLASM OF BREAST, UNSPECIFIED SCREENING MODALITY: ICD-10-CM

## 2025-05-29 PROBLEM — J06.9 URI (UPPER RESPIRATORY INFECTION): Status: RESOLVED | Noted: 2025-02-17 | Resolved: 2025-05-29

## 2025-05-29 LAB
CREAT UR-MCNC: 270.3 MG/DL (ref 45–106)
MICROALBUMIN UR-MCNC: 9.9 UG/ML
MICROALBUMIN/CREAT RATIO PNL UR: 3.7 MG/GM CR (ref 0–30)

## 2025-05-29 PROCEDURE — 82043 UR ALBUMIN QUANTITATIVE: CPT | Performed by: NURSE PRACTITIONER

## 2025-05-29 PROCEDURE — 99214 OFFICE O/P EST MOD 30 MIN: CPT | Mod: ,,, | Performed by: NURSE PRACTITIONER

## 2025-05-29 PROCEDURE — 3079F DIAST BP 80-89 MM HG: CPT | Mod: CPTII,,, | Performed by: NURSE PRACTITIONER

## 2025-05-29 PROCEDURE — 3008F BODY MASS INDEX DOCD: CPT | Mod: CPTII,,, | Performed by: NURSE PRACTITIONER

## 2025-05-29 PROCEDURE — 1160F RVW MEDS BY RX/DR IN RCRD: CPT | Mod: CPTII,,, | Performed by: NURSE PRACTITIONER

## 2025-05-29 PROCEDURE — 3074F SYST BP LT 130 MM HG: CPT | Mod: CPTII,,, | Performed by: NURSE PRACTITIONER

## 2025-05-29 PROCEDURE — 3044F HG A1C LEVEL LT 7.0%: CPT | Mod: CPTII,,, | Performed by: NURSE PRACTITIONER

## 2025-05-29 PROCEDURE — 1159F MED LIST DOCD IN RCRD: CPT | Mod: CPTII,,, | Performed by: NURSE PRACTITIONER

## 2025-05-29 PROCEDURE — G2211 COMPLEX E/M VISIT ADD ON: HCPCS | Mod: ,,, | Performed by: NURSE PRACTITIONER

## 2025-05-29 RX ORDER — CETIRIZINE HYDROCHLORIDE 10 MG/1
10 TABLET ORAL DAILY PRN
COMMUNITY
Start: 2025-02-18

## 2025-05-29 RX ORDER — FERROUS SULFATE 325(65) MG
325 TABLET ORAL EVERY OTHER DAY
Qty: 45 TABLET | Refills: 3 | Status: SHIPPED | OUTPATIENT
Start: 2025-05-29

## 2025-05-29 NOTE — PROGRESS NOTES
"Patient ID: [id number]     Chief Complaint: Diabetes      Subjective     Alice Hoffman is a 45 y.o. female in the office for Diabetes      History of Present Illness    CHIEF COMPLAINT:  Patient presents today for pain and lump in heel after injury    HISTORY OF PRESENT ILLNESS:  She sustained a heel injury 3 weeks ago after slipping on wet mud on a  deck, causing her leg to slam down hard. She experienced immediate and intense pain requiring brief rest. She reports worsening pain in the Achilles area with a palpable knot formation on the back  of the heel. She experiences sharp, stabbing pain particularly when walking and has developed a limp due to the discomfort.    PAST MEDICAL HISTORY:  She has a history of multiple ankle injuries including sprains and ligament tears within the past 15 years, dating back to high school.    MEDICATIONS:  She continues Zepbound 1 mg.      ROS:  ROS as indicated in HPI.         Past Medical History:  has a past medical history of Heartburn.    Social History:  reports that she has never smoked. She has never used smokeless tobacco.    Current Outpatient Medications   Medication Instructions    cetirizine (ZYRTEC) 10 mg, Daily PRN    ferrous sulfate (IRON) 325 mg, Oral, Every other day    OZEMPIC 1 mg, Subcutaneous, Every 7 days    tiZANidine (ZANAFLEX) 4 mg, Oral, Every 8 hours PRN       Patient is allergic to penicillins.     Patient Care Team:  Julisa Harrington FNP-C as PCP - General (Family Medicine)  Javy Lora OD as Consulting Physician (Optometry)  Sicard, Mark A., MD (General Surgery)     Objective     Visit Vitals  /80 (BP Location: Right arm)   Pulse 80   Temp 97 °F (36.1 °C) (Temporal)   Ht 5' 2.6" (1.59 m)   Wt 112.9 kg (249 lb)   SpO2 98%   BMI 44.67 kg/m²       Physical Exam  Vitals reviewed.   Constitutional:       General: She is not in acute distress.     Appearance: Normal appearance. She is obese.   HENT:      Head: Normocephalic. "      Nose: Nose normal.      Mouth/Throat:      Mouth: Mucous membranes are moist.      Pharynx: Oropharynx is clear.   Cardiovascular:      Rate and Rhythm: Normal rate and regular rhythm.      Heart sounds: Normal heart sounds.   Pulmonary:      Effort: Pulmonary effort is normal.      Breath sounds: Normal breath sounds.   Musculoskeletal:         General: Normal range of motion.      Cervical back: Normal range of motion and neck supple.      Comments: Unable to heel raise on left.  Firm palpable lump noted near insertion of achilles.    Skin:     General: Skin is warm and dry.   Neurological:      Mental Status: She is alert and oriented to person, place, and time. Mental status is at baseline.   Psychiatric:         Mood and Affect: Mood normal.         Behavior: Behavior normal.         Assessment & Plan     1. Type 2 diabetes mellitus without complication, without long-term current use of insulin  Assessment & Plan:  Lab Results   Component Value Date    HGBA1C 5.6 02/10/2025      Continue medications without change.   Encouraged to follow ADA diet. Avoid soda, sweets, and limit rice/pasta/breads/starches (no more than 45-50 grams per meal).   Encouraged to implement exercise into daily regimen.  Encouraged compliance with both medications and diet to limit long term and negative effects from the disease.    Monitory glucose levels each morning and record.  Please bring for review at next appointment.  Stressed importance of daily foot exams and annual dilated eye exams.      Orders:  -     Microalbumin/Creatinine Ratio, Urine  -     CBC Auto Differential; Future; Expected date: 08/29/2025  -     Comprehensive Metabolic Panel; Future; Expected date: 08/29/2025  -     Hemoglobin A1C; Future; Expected date: 08/29/2025  -     Vitamin D; Future; Expected date: 08/29/2025    2. Iron deficiency  Overview:  Low iron, no anemia.  Suggest trial of oral iron.     Orders:  -     Iron and TIBC; Future; Expected date:  05/29/2025  -     Lipid Panel; Future; Expected date: 08/29/2025  -     TSH; Future; Expected date: 08/29/2025    3. Encounter for screening for malignant neoplasm of breast, unspecified screening modality  -     Mammo Digital Screening Bilat w/ Andrea (XPD); Future; Expected date: 05/29/2025    4. Left foot pain  Assessment & Plan:  If XR negative, will need MRI to rule out partial achilles tendon rupture.  I'm suspicious due to her progressively worsening pain with weight bearing and rolling up on the ball of her foot.  She has classic findings including palpable lump and weakness with heel raises.      Orders:  -     X-Ray Foot Complete Left; Future; Expected date: 05/29/2025    Other orders  -     ferrous sulfate (IRON) 325 mg (65 mg iron) Tab tablet; Take 1 tablet (325 mg total) by mouth every other day.  Dispense: 45 tablet; Refill: 3         Assessment & Plan    E11.9 Type 2 diabetes mellitus without complication, without long-term current use of insulin  E61.1 Iron deficiency  Z12.39 Encounter for screening for malignant neoplasm of breast, unspecified screening modality  M79.672 Left foot pain    IMPRESSION:  - Assessed heel pain and lump, considering possibility of bone spur or scar tissue formation following trauma.    E11.9 TYPE 2 DIABETES MELLITUS WITHOUT COMPLICATION, WITHOUT LONG-TERM CURRENT USE OF INSULIN:  - Started Zepbound 1 mg to be taken every other day.  - Patient to schedule diabetic eye exam.    E61.1 IRON DEFICIENCY:  - Follow up in 3 months for iron level recheck and energy assessment.    M79.672 LEFT FOOT PAIN:  - Ordered XR Heel to evaluate injury, with plan to consider MRI if XR is inconclusive.          Follow up for 1), 3 mo, Wellness, fasting labs prior. In addition to their next scheduled appointment, the patient has also been instructed to follow up on as needed basis.     Future Appointments   Date Time Provider Department Center   8/29/2025  8:50 AM LAB, [facility] LABORATORY DRAW  STATION [facility] [place] CARLEEN [facility]   9/3/2025  2:00 PM Julisa Harrington, SIMINP-C [facility]          This note was generated with the assistance of ambient listening technology. Verbal consent was obtained by the patient and accompanying visitor(s) for the recording of patient appointment to facilitate this note. I attest to having reviewed and edited the generated note for accuracy, though some syntax or spelling errors may persist. Please contact the author of this note for any clarification.

## 2025-05-29 NOTE — ASSESSMENT & PLAN NOTE
Lab Results   Component Value Date    HGBA1C 5.6 02/10/2025      Continue medications without change.   Encouraged to follow ADA diet. Avoid soda, sweets, and limit rice/pasta/breads/starches (no more than 45-50 grams per meal).   Encouraged to implement exercise into daily regimen.  Encouraged compliance with both medications and diet to limit long term and negative effects from the disease.    Monitory glucose levels each morning and record.  Please bring for review at next appointment.  Stressed importance of daily foot exams and annual dilated eye exams.

## 2025-05-30 ENCOUNTER — RESULTS FOLLOW-UP (OUTPATIENT)
Dept: FAMILY MEDICINE | Facility: CLINIC | Age: 45
End: 2025-05-30

## 2025-05-30 ENCOUNTER — TELEPHONE (OUTPATIENT)
Dept: FAMILY MEDICINE | Facility: CLINIC | Age: 45
End: 2025-05-30
Payer: MEDICAID

## 2025-05-30 DIAGNOSIS — A04.5 CAMPYLOBACTER GASTROENTERITIS: ICD-10-CM

## 2025-05-30 DIAGNOSIS — M79.672 LEFT FOOT PAIN: Primary | ICD-10-CM

## 2025-05-30 NOTE — TELEPHONE ENCOUNTER
----- Message from SARAH Fink sent at 5/30/2025  7:06 AM CDT -----  XR completed but no significant findings. I want to rule out a partial achilles tendon rupture vs a tendinopathy with MRI.  Will work on getting insurance approval and someone will be calling you to   schedule. In the meantime, just rest and stop stretching exercises.    ----- Message -----  From: Interface, Rad Results In  Sent: 5/29/2025   2:50 PM CDT  To: SARAH Camargo

## 2025-05-30 NOTE — PROGRESS NOTES
XR completed but no significant findings. I want to rule out a partial achilles tendon rupture vs a tendinopathy with MRI.  Will work on getting insurance approval and someone will be calling you to schedule. In the meantime, just rest and stop stretching exercises.

## 2025-05-30 NOTE — TELEPHONE ENCOUNTER
Spoke with patient, she will call Monday and schedule if they have not called her yet and let me know when she schedules it.

## 2025-05-30 NOTE — ASSESSMENT & PLAN NOTE
If XR negative, will need MRI to rule out partial achilles tendon rupture.  I'm suspicious due to her progressively worsening pain with weight bearing and rolling up on the ball of her foot.  She has classic findings including palpable lump and weakness with heel raises.

## 2025-06-03 ENCOUNTER — PATIENT MESSAGE (OUTPATIENT)
Dept: FAMILY MEDICINE | Facility: CLINIC | Age: 45
End: 2025-06-03
Payer: MEDICAID

## 2025-06-11 ENCOUNTER — HOSPITAL ENCOUNTER (OUTPATIENT)
Dept: RADIOLOGY | Facility: HOSPITAL | Age: 45
Discharge: HOME | End: 2025-06-11
Attending: NURSE PRACTITIONER
Payer: MEDICAID

## 2025-06-11 DIAGNOSIS — M79.672 LEFT FOOT PAIN: ICD-10-CM

## 2025-06-11 PROCEDURE — 73718 MRI LOWER EXTREMITY W/O DYE: CPT | Mod: TC,LT

## 2025-06-12 ENCOUNTER — PATIENT MESSAGE (OUTPATIENT)
Dept: FAMILY MEDICINE | Facility: CLINIC | Age: 45
End: 2025-06-12
Payer: MEDICAID

## 2025-06-12 DIAGNOSIS — R19.7 DIARRHEA, UNSPECIFIED TYPE: Primary | ICD-10-CM

## 2025-06-12 RX ORDER — DIPHENOXYLATE HYDROCHLORIDE AND ATROPINE SULFATE 2.5; .025 MG/1; MG/1
1 TABLET ORAL 4 TIMES DAILY PRN
Qty: 5 TABLET | Refills: 0 | Status: SHIPPED | OUTPATIENT
Start: 2025-06-12 | End: 2025-06-22

## 2025-06-13 ENCOUNTER — RESULTS FOLLOW-UP (OUTPATIENT)
Dept: FAMILY MEDICINE | Facility: CLINIC | Age: 45
End: 2025-06-13

## 2025-06-16 ENCOUNTER — TELEPHONE (OUTPATIENT)
Dept: FAMILY MEDICINE | Facility: CLINIC | Age: 45
End: 2025-06-16
Payer: MEDICAID

## 2025-06-16 NOTE — TELEPHONE ENCOUNTER
----- Message from SARAH Fink sent at 6/13/2025  1:01 PM CDT -----  Please inform patient of results:    I think she'd benefit from some physical therapy.  Definitely need to be resting and taking it easy, icing it when able.  Don't force stretching until  seen by PT.  Order not in yet, waiting on her   to agree.   ----- Message -----  From: Interface, Rad Results In  Sent: 6/12/2025   3:44 PM CDT  To: SARAH Camargo

## 2025-07-08 ENCOUNTER — HOSPITAL ENCOUNTER (OUTPATIENT)
Dept: RADIOLOGY | Facility: HOSPITAL | Age: 45
Discharge: HOME | End: 2025-07-08
Attending: NURSE PRACTITIONER
Payer: MEDICAID

## 2025-07-08 DIAGNOSIS — Z12.39 ENCOUNTER FOR SCREENING FOR MALIGNANT NEOPLASM OF BREAST, UNSPECIFIED SCREENING MODALITY: ICD-10-CM

## 2025-07-08 PROCEDURE — 77067 SCR MAMMO BI INCL CAD: CPT | Mod: TC

## 2025-07-09 ENCOUNTER — OFFICE VISIT (OUTPATIENT)
Dept: FAMILY MEDICINE | Facility: CLINIC | Age: 45
End: 2025-07-09
Payer: MEDICAID

## 2025-07-09 VITALS
BODY MASS INDEX: 42.31 KG/M2 | OXYGEN SATURATION: 97 % | TEMPERATURE: 98 F | SYSTOLIC BLOOD PRESSURE: 118 MMHG | HEIGHT: 63 IN | HEART RATE: 92 BPM | DIASTOLIC BLOOD PRESSURE: 82 MMHG | WEIGHT: 238.81 LBS

## 2025-07-09 DIAGNOSIS — R10.12 LUQ ABDOMINAL PAIN: ICD-10-CM

## 2025-07-09 DIAGNOSIS — E61.1 IRON DEFICIENCY: ICD-10-CM

## 2025-07-09 DIAGNOSIS — R19.7 DIARRHEA, UNSPECIFIED TYPE: Primary | ICD-10-CM

## 2025-07-09 LAB
ADV 40+41 DNA STL QL NAA+NON-PROBE: NOT DETECTED
ALBUMIN SERPL-MCNC: 4.4 G/DL (ref 3.4–5)
ALBUMIN/GLOB SERPL: 1.3 RATIO
ALP SERPL-CCNC: 113 UNIT/L (ref 50–144)
ALT SERPL-CCNC: 24 UNIT/L (ref 1–45)
ANION GAP SERPL CALC-SCNC: 7 MEQ/L (ref 2–13)
AST SERPL-CCNC: 29 UNIT/L (ref 14–36)
ASTRO TYP 1-8 RNA STL QL NAA+NON-PROBE: NOT DETECTED
BASOPHILS # BLD AUTO: 0.05 X10(3)/MCL (ref 0.01–0.08)
BASOPHILS NFR BLD AUTO: 0.7 % (ref 0.1–1.2)
BILIRUB SERPL-MCNC: 0.6 MG/DL (ref 0–1)
BUN SERPL-MCNC: 8 MG/DL (ref 7–20)
C CAYETANENSIS DNA STL QL NAA+NON-PROBE: NOT DETECTED
C COLI+JEJ+UPSA DNA STL QL NAA+NON-PROBE: DETECTED
CALCIUM SERPL-MCNC: 9.6 MG/DL (ref 8.4–10.2)
CHLORIDE SERPL-SCNC: 104 MMOL/L (ref 98–110)
CO2 SERPL-SCNC: 27 MMOL/L (ref 21–32)
CONSISTENCY STL: ABNORMAL
CREAT SERPL-MCNC: 0.77 MG/DL (ref 0.66–1.25)
CREAT/UREA NIT SERPL: 10 (ref 12–20)
CRYPTOSP DNA STL QL NAA+NON-PROBE: NOT DETECTED
E HISTOLYT DNA STL QL NAA+NON-PROBE: NOT DETECTED
EAEC PAA PLAS AGGR+AATA ST NAA+NON-PRB: NOT DETECTED
EC STX1+STX2 GENES STL QL NAA+NON-PROBE: NOT DETECTED
EOSINOPHIL # BLD AUTO: 0.18 X10(3)/MCL (ref 0.04–0.36)
EOSINOPHIL NFR BLD AUTO: 2.6 % (ref 0.7–7)
EPEC EAE GENE STL QL NAA+NON-PROBE: NOT DETECTED
ERYTHROCYTE [DISTWIDTH] IN BLOOD BY AUTOMATED COUNT: 14 % (ref 11–14.5)
ETEC LTA+ST1A+ST1B TOX ST NAA+NON-PROBE: NOT DETECTED
G LAMBLIA DNA STL QL NAA+NON-PROBE: NOT DETECTED
GFR SERPLBLD CREATININE-BSD FMLA CKD-EPI: >90 ML/MIN/1.73/M2
GLOBULIN SER-MCNC: 3.3 GM/DL (ref 2–3.9)
GLUCOSE SERPL-MCNC: 99 MG/DL (ref 70–115)
HCT VFR BLD AUTO: 38.9 % (ref 36–48)
HGB BLD-MCNC: 12.7 G/DL (ref 11.8–16)
IMM GRANULOCYTES # BLD AUTO: 0.02 X10(3)/MCL (ref 0–0.03)
IMM GRANULOCYTES NFR BLD AUTO: 0.3 % (ref 0–0.5)
IRON SATN MFR SERPL: 16 % (ref 20–50)
IRON SERPL-MCNC: 52 UG/DL (ref 50–170)
LIPASE SERPL-CCNC: 68 U/L (ref 23–300)
LYMPHOCYTES # BLD AUTO: 2.06 X10(3)/MCL (ref 1.16–3.74)
LYMPHOCYTES NFR BLD AUTO: 29.4 % (ref 20–55)
MCH RBC QN AUTO: 27.8 PG (ref 27–34)
MCHC RBC AUTO-ENTMCNC: 32.6 G/DL (ref 31–37)
MCV RBC AUTO: 85.1 FL (ref 79–99)
MONOCYTES # BLD AUTO: 0.52 X10(3)/MCL (ref 0.24–0.36)
MONOCYTES NFR BLD AUTO: 7.4 % (ref 4.7–12.5)
NEUTROPHILS # BLD AUTO: 4.18 X10(3)/MCL (ref 1.56–6.13)
NEUTROPHILS NFR BLD AUTO: 59.6 % (ref 37–73)
NOROVIRUS GI+II RNA STL QL NAA+NON-PROBE: NOT DETECTED
NRBC BLD AUTO-RTO: 0 %
P SHIGELLOIDES DNA STL QL NAA+NON-PROBE: NOT DETECTED
PLATELET # BLD AUTO: 331 X10(3)/MCL (ref 140–371)
PMV BLD AUTO: 11.6 FL (ref 9.4–12.4)
POTASSIUM SERPL-SCNC: 4.1 MMOL/L (ref 3.5–5.1)
PROT SERPL-MCNC: 7.7 GM/DL (ref 6.3–8.2)
RBC # BLD AUTO: 4.57 X10(6)/MCL (ref 4–5.1)
RVA RNA STL QL NAA+NON-PROBE: NOT DETECTED
S ENT+BONG DNA STL QL NAA+NON-PROBE: NOT DETECTED
SAPO I+II+IV+V RNA STL QL NAA+NON-PROBE: NOT DETECTED
SHIGELLA SP+EIEC IPAH ST NAA+NON-PROBE: NOT DETECTED
SODIUM SERPL-SCNC: 138 MMOL/L (ref 136–145)
TIBC SERPL-MCNC: 273 UG/DL (ref 70–310)
TIBC SERPL-MCNC: 325 UG/DL (ref 250–450)
TRANSFERRIN SERPL-MCNC: 289 MG/DL (ref 180–382)
V CHOL+PARA+VUL DNA STL QL NAA+NON-PROBE: NOT DETECTED
V CHOLERAE DNA STL QL NAA+NON-PROBE: NOT DETECTED
WBC # BLD AUTO: 7.01 X10(3)/MCL (ref 4–11.5)
Y ENTEROCOL DNA STL QL NAA+NON-PROBE: NOT DETECTED

## 2025-07-09 PROCEDURE — 83690 ASSAY OF LIPASE: CPT | Performed by: NURSE PRACTITIONER

## 2025-07-09 PROCEDURE — 87507 IADNA-DNA/RNA PROBE TQ 12-25: CPT | Performed by: NURSE PRACTITIONER

## 2025-07-09 PROCEDURE — 3079F DIAST BP 80-89 MM HG: CPT | Mod: CPTII,,, | Performed by: NURSE PRACTITIONER

## 2025-07-09 PROCEDURE — 3074F SYST BP LT 130 MM HG: CPT | Mod: CPTII,,, | Performed by: NURSE PRACTITIONER

## 2025-07-09 PROCEDURE — 3044F HG A1C LEVEL LT 7.0%: CPT | Mod: CPTII,,, | Performed by: NURSE PRACTITIONER

## 2025-07-09 PROCEDURE — 80053 COMPREHEN METABOLIC PANEL: CPT | Performed by: NURSE PRACTITIONER

## 2025-07-09 PROCEDURE — 3008F BODY MASS INDEX DOCD: CPT | Mod: CPTII,,, | Performed by: NURSE PRACTITIONER

## 2025-07-09 PROCEDURE — 99214 OFFICE O/P EST MOD 30 MIN: CPT | Mod: ,,, | Performed by: NURSE PRACTITIONER

## 2025-07-09 PROCEDURE — G2211 COMPLEX E/M VISIT ADD ON: HCPCS | Mod: ,,, | Performed by: NURSE PRACTITIONER

## 2025-07-09 PROCEDURE — 1159F MED LIST DOCD IN RCRD: CPT | Mod: CPTII,,, | Performed by: NURSE PRACTITIONER

## 2025-07-09 PROCEDURE — 83540 ASSAY OF IRON: CPT | Performed by: NURSE PRACTITIONER

## 2025-07-09 PROCEDURE — 3066F NEPHROPATHY DOC TX: CPT | Mod: CPTII,,, | Performed by: NURSE PRACTITIONER

## 2025-07-09 PROCEDURE — 3061F NEG MICROALBUMINURIA REV: CPT | Mod: CPTII,,, | Performed by: NURSE PRACTITIONER

## 2025-07-09 PROCEDURE — 85025 COMPLETE CBC W/AUTO DIFF WBC: CPT | Performed by: NURSE PRACTITIONER

## 2025-07-09 NOTE — PROGRESS NOTES
Patient ID: [id number]     Chief Complaint: Abdominal Pain and Diarrhea      Subjective     Alice Hoffman is a 45 y.o. female in the office for Abdominal Pain and Diarrhea      History of Present Illness    CHIEF COMPLAINT:  Patient presents today for persistent diarrhea since early June.    HISTORY OF PRESENT ILLNESS:  She reports initial illness onset in early June with respiratory cough, low-grade fever, nausea, constant diarrhea, and significant fatigue. She felt extremely ill during the first week with excessive sleep and inability to eat. She estimates being sick approximately six times since the initial illness episode. She currently experiences persistent diarrhea triggered by solid food intake, occurring up to 13 times per day and worsening after eating even small amounts like toast or a sandwich. Stools are watery and mucousy with occasional blood. She experiences sharp, localized right-sided abdominal pain that intensifies when lying in a fetal position and temporarily subsides after bowel movements. The pain occurs within minutes of initial bowel movement, often requiring immediate return to the bathroom. She has decreased appetite and now has to force herself to eat. She denies fever, significant gas, or foul-smelling stools.    MEDICAL HISTORY:  She has a history of cholecystectomy performed two years ago with typical gallbladder symptoms prior to surgery. She has known internal hemorrhoids and underwent colonoscopy one year ago. She attributes potential rectal bleeding to hemorrhoid irritation from frequent bowel movements, noting this is less severe compared to her pre-colonoscopy status.    MEDICATIONS:  She is currently taking Ozempic 1 mg weekly administered on Fridays. She previously tried Lomotil without therapeutic effect.      ROS:  ROS as indicated in HPI.         Past Medical History:  has a past medical history of Heartburn.    Social History:  reports that she has never smoked. She has  "never used smokeless tobacco.    Current Outpatient Medications   Medication Instructions    cetirizine (ZYRTEC) 10 mg, Daily PRN    ferrous sulfate (IRON) 325 mg, Oral, Every other day    OZEMPIC 1 mg, Subcutaneous, Every 7 days       Patient is allergic to penicillins.     Patient Care Team:  Julisa Harrington FNP-C as PCP - General (Family Medicine)  Javy Lora OD as Consulting Physician (Optometry)  Sicard, Mark A., MD (General Surgery)     Objective     Visit Vitals  /82 (BP Location: Right arm, Patient Position: Sitting)   Pulse 92   Temp 98.2 °F (36.8 °C) (Temporal)   Ht 5' 2.6" (1.59 m)   Wt 108.3 kg (238 lb 12.8 oz)   SpO2 97%   BMI 42.85 kg/m²       Physical Exam  Vitals reviewed.   Constitutional:       General: She is not in acute distress.  Cardiovascular:      Rate and Rhythm: Normal rate and regular rhythm.      Heart sounds: Normal heart sounds.   Pulmonary:      Effort: Pulmonary effort is normal.      Breath sounds: Normal breath sounds.   Abdominal:      General: Bowel sounds are normal.      Palpations: There is no mass.      Tenderness: There is abdominal tenderness in the epigastric area and left upper quadrant.   Skin:     General: Skin is warm and dry.   Neurological:      Mental Status: She is alert and oriented to person, place, and time.   Psychiatric:         Mood and Affect: Mood normal.         Assessment & Plan     1. Diarrhea, unspecified type  -     US Abdomen Complete; Future; Expected date: 07/09/2025  -     CBC Auto Differential; Future; Expected date: 07/09/2025  -     Comprehensive Metabolic Panel; Future; Expected date: 07/09/2025  -     Gastrointestinal Pathogens Panel, PCR; Future; Expected date: 07/09/2025  -     Lipase; Future; Expected date: 07/09/2025    2. LUQ abdominal pain  -     Lipase; Future; Expected date: 07/09/2025    3. Iron deficiency  Overview:  Low iron, no anemia.  Suggest trial of oral iron.     Orders:  -     Iron and TIBC     "     Assessment & Plan    R19.7 Diarrhea, unspecified type  R10.12 LUQ abdominal pain  E61.1 Iron deficiency    IMPRESSION:  - Considered multiple potential causes for persistent diarrhea since early June, including viral infection, IBS, pancreatic issues, and diverticulitis.  - Need to rule out infection despite lack of recent travel.  - Suspect possible side effect from Ozempic.  - Plan diagnostic workup including abdominal US, liver enzyme tests, and stool sample analysis.    R19.7 DIARRHEA, UNSPECIFIED TYPE:  - Discontinued Ozempic - instructed to skip scheduled Friday injection to assess if symptoms improve and do not resume at this time.  - Ordered stool sample collection and testing for infection.  - Patient to contact office after completing lab work today.    R10.12 LUQ ABDOMINAL PAIN:  - Ordered abdominal ultrasound to evaluate abdominal organs.  - Follow up after completion of abdominal US, which may not be until next week.    E61.1 IRON DEFICIENCY:  - collected labs to determine efficacy of oral iron trial.          Follow up if symptoms worsen or fail to improve. In addition to their next scheduled appointment, the patient has also been instructed to follow up on as needed basis.     Future Appointments   Date Time Provider Department Center   7/16/2025  9:00 AM [facility] US1 [facility] ULTRA [employer] [place]   8/29/2025  8:50 AM [facility], [facility] LABORATORY DRAW STATION [facility] CARLEEN [place]   9/3/2025  2:00 PM [facility]        This note was generated with the assistance of ambient listening technology. Verbal consent was obtained by the patient and accompanying visitor(s) for the recording of patient appointment to facilitate this note. I attest to having reviewed and edited the generated note for accuracy, though some syntax or spelling errors may persist. Please contact the author of this note for any clarification.

## 2025-07-10 ENCOUNTER — TELEPHONE (OUTPATIENT)
Dept: FAMILY MEDICINE | Facility: CLINIC | Age: 45
End: 2025-07-10
Payer: MEDICAID

## 2025-07-10 RX ORDER — AZITHROMYCIN 250 MG/1
TABLET, FILM COATED ORAL
Qty: 6 TABLET | Refills: 0 | Status: SHIPPED | OUTPATIENT
Start: 2025-07-10 | End: 2025-07-15

## 2025-07-10 NOTE — TELEPHONE ENCOUNTER
----- Message from SARAH Fink sent at 7/10/2025  7:03 AM CDT -----  Please inform patient of results:    Stool positive for CAMPYLOBACTER.  It's one of the leading causes of acute diarrhea worldwide and is the most commonly reported bacterial cause of acute gastroenteritis in developed countries. It is   also a major cause of travelers' diarrhea.     ----- Message -----  From: Lab, Background User  Sent: 7/9/2025   3:22 PM CDT  To: SARAH Camargo

## 2025-07-16 ENCOUNTER — PATIENT MESSAGE (OUTPATIENT)
Dept: FAMILY MEDICINE | Facility: CLINIC | Age: 45
End: 2025-07-16
Payer: MEDICAID

## 2025-07-24 ENCOUNTER — PATIENT MESSAGE (OUTPATIENT)
Dept: FAMILY MEDICINE | Facility: CLINIC | Age: 45
End: 2025-07-24
Payer: MEDICAID

## 2025-07-24 DIAGNOSIS — E11.9 TYPE 2 DIABETES MELLITUS WITHOUT COMPLICATION, WITHOUT LONG-TERM CURRENT USE OF INSULIN: Primary | ICD-10-CM

## 2025-07-24 RX ORDER — SEMAGLUTIDE 2.68 MG/ML
2 INJECTION, SOLUTION SUBCUTANEOUS
Qty: 2 ML | Refills: 5 | Status: SHIPPED | OUTPATIENT
Start: 2025-07-24 | End: 2025-07-25 | Stop reason: SDUPTHER

## 2025-07-25 RX ORDER — SEMAGLUTIDE 2.68 MG/ML
2 INJECTION, SOLUTION SUBCUTANEOUS
Qty: 2 ML | Refills: 5 | Status: SHIPPED | OUTPATIENT
Start: 2025-07-25 | End: 2026-07-25

## 2025-08-29 ENCOUNTER — TELEPHONE (OUTPATIENT)
Dept: FAMILY MEDICINE | Facility: CLINIC | Age: 45
End: 2025-08-29
Payer: MEDICAID

## 2025-08-29 PROCEDURE — 85025 COMPLETE CBC W/AUTO DIFF WBC: CPT | Performed by: NURSE PRACTITIONER

## 2025-08-29 PROCEDURE — 82306 VITAMIN D 25 HYDROXY: CPT | Performed by: NURSE PRACTITIONER

## 2025-08-29 PROCEDURE — 80061 LIPID PANEL: CPT | Performed by: NURSE PRACTITIONER

## 2025-08-29 PROCEDURE — 83036 HEMOGLOBIN GLYCOSYLATED A1C: CPT | Performed by: NURSE PRACTITIONER

## 2025-08-29 PROCEDURE — 80053 COMPREHEN METABOLIC PANEL: CPT | Performed by: NURSE PRACTITIONER

## 2025-08-29 PROCEDURE — 84443 ASSAY THYROID STIM HORMONE: CPT | Performed by: NURSE PRACTITIONER

## 2025-09-03 ENCOUNTER — OFFICE VISIT (OUTPATIENT)
Dept: FAMILY MEDICINE | Facility: CLINIC | Age: 45
End: 2025-09-03
Payer: MEDICAID

## 2025-09-03 VITALS
WEIGHT: 238 LBS | HEART RATE: 78 BPM | SYSTOLIC BLOOD PRESSURE: 122 MMHG | BODY MASS INDEX: 42.17 KG/M2 | OXYGEN SATURATION: 98 % | HEIGHT: 63 IN | DIASTOLIC BLOOD PRESSURE: 80 MMHG | TEMPERATURE: 98 F

## 2025-09-03 DIAGNOSIS — F90.0 ATTENTION DEFICIT HYPERACTIVITY DISORDER (ADHD), PREDOMINANTLY INATTENTIVE TYPE: ICD-10-CM

## 2025-09-03 DIAGNOSIS — Z00.00 ENCOUNTER FOR ROUTINE ADULT HEALTH EXAMINATION WITHOUT ABNORMAL FINDINGS: Primary | ICD-10-CM

## 2025-09-03 DIAGNOSIS — E66.813 CLASS 3 SEVERE OBESITY WITH SERIOUS COMORBIDITY AND BODY MASS INDEX (BMI) OF 40.0 TO 44.9 IN ADULT, UNSPECIFIED OBESITY TYPE: ICD-10-CM

## 2025-09-03 DIAGNOSIS — E11.9 TYPE 2 DIABETES MELLITUS WITHOUT COMPLICATION, WITHOUT LONG-TERM CURRENT USE OF INSULIN: ICD-10-CM

## 2025-09-03 PROBLEM — E66.9 OBESITY WITH SERIOUS COMORBIDITY: Status: ACTIVE | Noted: 2025-09-03

## 2025-09-03 PROCEDURE — 3044F HG A1C LEVEL LT 7.0%: CPT | Mod: CPTII,,, | Performed by: NURSE PRACTITIONER

## 2025-09-03 PROCEDURE — 3008F BODY MASS INDEX DOCD: CPT | Mod: CPTII,,, | Performed by: NURSE PRACTITIONER

## 2025-09-03 PROCEDURE — 3061F NEG MICROALBUMINURIA REV: CPT | Mod: CPTII,,, | Performed by: NURSE PRACTITIONER

## 2025-09-03 PROCEDURE — 3079F DIAST BP 80-89 MM HG: CPT | Mod: CPTII,,, | Performed by: NURSE PRACTITIONER

## 2025-09-03 PROCEDURE — 1160F RVW MEDS BY RX/DR IN RCRD: CPT | Mod: CPTII,,, | Performed by: NURSE PRACTITIONER

## 2025-09-03 PROCEDURE — 99396 PREV VISIT EST AGE 40-64: CPT | Mod: ,,, | Performed by: NURSE PRACTITIONER

## 2025-09-03 PROCEDURE — 3066F NEPHROPATHY DOC TX: CPT | Mod: CPTII,,, | Performed by: NURSE PRACTITIONER

## 2025-09-03 PROCEDURE — 3074F SYST BP LT 130 MM HG: CPT | Mod: CPTII,,, | Performed by: NURSE PRACTITIONER

## 2025-09-03 PROCEDURE — 1159F MED LIST DOCD IN RCRD: CPT | Mod: CPTII,,, | Performed by: NURSE PRACTITIONER

## 2025-09-03 RX ORDER — LISDEXAMFETAMINE DIMESYLATE 50 MG/1
50 CAPSULE ORAL EVERY MORNING
Qty: 30 CAPSULE | Refills: 0 | Status: SHIPPED | OUTPATIENT
Start: 2025-09-03

## 2025-09-03 RX ORDER — LISDEXAMFETAMINE DIMESYLATE 50 MG/1
50 CAPSULE ORAL EVERY MORNING
COMMUNITY
End: 2025-09-03 | Stop reason: SDUPTHER